# Patient Record
Sex: FEMALE | Race: WHITE | NOT HISPANIC OR LATINO | Employment: STUDENT | URBAN - METROPOLITAN AREA
[De-identification: names, ages, dates, MRNs, and addresses within clinical notes are randomized per-mention and may not be internally consistent; named-entity substitution may affect disease eponyms.]

---
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
## 2024-04-23 ENCOUNTER — OFFICE VISIT (OUTPATIENT)
Dept: URGENT CARE | Facility: URGENT CARE | Age: 19
End: 2024-04-23
Payer: COMMERCIAL

## 2024-04-23 VITALS
SYSTOLIC BLOOD PRESSURE: 110 MMHG | HEART RATE: 109 BPM | TEMPERATURE: 98.3 F | DIASTOLIC BLOOD PRESSURE: 79 MMHG | WEIGHT: 168 LBS | OXYGEN SATURATION: 99 %

## 2024-04-23 DIAGNOSIS — R07.0 THROAT PAIN: ICD-10-CM

## 2024-04-23 DIAGNOSIS — J02.0 STREP THROAT: Primary | ICD-10-CM

## 2024-04-23 LAB — DEPRECATED S PYO AG THROAT QL EIA: POSITIVE

## 2024-04-23 PROCEDURE — 99213 OFFICE O/P EST LOW 20 MIN: CPT | Performed by: FAMILY MEDICINE

## 2024-04-23 PROCEDURE — 87880 STREP A ASSAY W/OPTIC: CPT

## 2024-04-23 RX ORDER — PENICILLIN V POTASSIUM 500 MG/1
500 TABLET, FILM COATED ORAL 2 TIMES DAILY
Qty: 20 TABLET | Refills: 0 | Status: SHIPPED | OUTPATIENT
Start: 2024-04-23 | End: 2024-05-03

## 2024-04-23 NOTE — PATIENT INSTRUCTIONS
Penicillin 500 mg twice daily for 10 days.  Be sure to take all the pills in the bottle.    Change toothbrush after 2 days on antibiotics.    Try gargling with Chloraseptic spray to help with sore throat pain in addition to using the ibuprofen.

## 2024-04-23 NOTE — PROGRESS NOTES
ICD-10-CM    1. Strep throat  J02.0       2. Throat pain  R07.0 Streptococcus A Rapid Screen w/Reflex to PCR - Clinic Collect        No evidence of peritonsillar abscess formation at this time.    PLAN:  Patient Instructions   Penicillin 500 mg twice daily for 10 days.  Be sure to take all the pills in the bottle.    Change toothbrush after 2 days on antibiotics.    Try gargling with Chloraseptic spray to help with sore throat pain in addition to using the ibuprofen.    SUBJECTIVE:  Piedad Zavala is a 18 year old female who presents to  today with sore throat since Sunday morning.  No known rashes.  Painful to swallow.  Appetite a bit decreased.  No vomiting or stomach pain.    OBJECTIVE:  /79   Pulse 109   Temp 98.3  F (36.8  C) (Tympanic)   Wt 76.2 kg (168 lb)   SpO2 99%   GEN: well-appearing, in NAD  ENT: TMs normal, oral MMM, pharynx erythematous, uvula midline, no exudates noted  Neck: anterior cervical LAD bilaterally, no posterior LAD noted    Results for orders placed or performed in visit on 04/23/24   Streptococcus A Rapid Screen w/Reflex to PCR - Clinic Collect     Status: Abnormal    Specimen: Throat; Swab   Result Value Ref Range    Group A Strep antigen Positive (A) Negative

## 2025-03-01 ENCOUNTER — RX ONLY (RX ONLY)
Age: 20
End: 2025-03-01

## 2025-06-17 ENCOUNTER — APPOINTMENT (OUTPATIENT)
Age: 20
Setting detail: DERMATOLOGY
End: 2025-06-17

## 2025-06-17 DIAGNOSIS — D22 MELANOCYTIC NEVI: ICD-10-CM

## 2025-06-17 DIAGNOSIS — Z71.89 OTHER SPECIFIED COUNSELING: ICD-10-CM

## 2025-06-17 PROBLEM — D22.5 MELANOCYTIC NEVI OF TRUNK: Status: ACTIVE | Noted: 2025-06-17

## 2025-06-17 PROCEDURE — ? COUNSELING

## 2025-06-17 PROCEDURE — ? SUNSCREEN RECOMMENDATIONS

## 2025-06-17 ASSESSMENT — LOCATION SIMPLE DESCRIPTION DERM
LOCATION SIMPLE: LEFT FOREHEAD
LOCATION SIMPLE: ABDOMEN
LOCATION SIMPLE: RIGHT LOWER BACK

## 2025-06-17 ASSESSMENT — LOCATION DETAILED DESCRIPTION DERM
LOCATION DETAILED: LEFT INFERIOR MEDIAL FOREHEAD
LOCATION DETAILED: RIGHT SUPERIOR MEDIAL MIDBACK
LOCATION DETAILED: EPIGASTRIC SKIN

## 2025-06-17 ASSESSMENT — LOCATION ZONE DERM
LOCATION ZONE: TRUNK
LOCATION ZONE: FACE